# Patient Record
Sex: FEMALE | Race: WHITE
[De-identification: names, ages, dates, MRNs, and addresses within clinical notes are randomized per-mention and may not be internally consistent; named-entity substitution may affect disease eponyms.]

---

## 2018-02-03 ENCOUNTER — HOSPITAL ENCOUNTER (EMERGENCY)
Dept: HOSPITAL 74 - JER | Age: 38
Discharge: HOME | End: 2018-02-03
Payer: COMMERCIAL

## 2018-02-03 VITALS — BODY MASS INDEX: 32.1 KG/M2

## 2018-02-03 VITALS — HEART RATE: 89 BPM | TEMPERATURE: 101.3 F | DIASTOLIC BLOOD PRESSURE: 73 MMHG | SYSTOLIC BLOOD PRESSURE: 133 MMHG

## 2018-02-03 DIAGNOSIS — J09.X2: ICD-10-CM

## 2018-02-03 DIAGNOSIS — O26.891: Primary | ICD-10-CM

## 2018-02-03 DIAGNOSIS — O98.511: ICD-10-CM

## 2018-02-03 DIAGNOSIS — Z3A.12: ICD-10-CM

## 2018-02-03 NOTE — PDOC
History of Present Illness





- General


Chief Complaint: Cold Symptoms


Stated Complaint: 12 WEEKS PREGNANT,FEVER,HEADACHE


Time Seen by Provider: 02/03/18 21:12


History Source: Patient, Spouse


Exam Limitations: No Limitations





- History of Present Illness


Initial Comments: 





02/03/18 22:22


37-year-old female without any medical history presents to the emergency 

department complaining of fever, chills without nausea/vomiting, headache, 

dizziness, lightheadedness, facial pains, rhinorrhea, nasal congestion, earaches

, sore throat, neck pain/stiffness, back pains, chest pain, shortness of breath

, abdominal pains, flank pains, urinary symptoms, vaginal bleed. Patient states 

her child was recently diagnosed with influenza a. Patient states she is 

adamantly refusing ultrasound of her pregnancy. She states she is only here for 

her fever/chills. Tmax of 101.0. x8h


Timing/Duration: reports: yesterday


Severity: reports: mild


Associated Symptoms: reports: cough, fever/chills, headache





Past History





- Past Medical History


Allergies/Adverse Reactions: 


 Allergies











Allergy/AdvReac Type Severity Reaction Status Date / Time


 


aspirin Allergy Intermediate Rash Verified 02/03/18 21:06











Home Medications: 


Ambulatory Orders





Oseltamivir Phosphate [Tamiflu -] 75 mg PO BID #9 capsule 02/03/18 








Anemia: No


Asthma: No


Cancer: No


Cardiac Disorders: No


CVA: No


COPD: No


CHF: No


Dementia: No


Diabetes: No


GI Disorders: No


 Disorders: No


HTN: No


Hypercholesterolemia: No


Liver Disease: No


Seizures: No


Thyroid Disease: No





- Surgical History


Abdominal Surgery: Yes (TUMMY TUCK)


Appendectomy: No


Cardiac Surgery: No


Cholecystectomy: No


Lung Surgery: No


Neurologic Surgery: No


Orthopedic Surgery: No





- Suicide/Smoking/Psychosocial Hx


Smoking Status: No


Smoking History: Never smoked


Have you smoked in the past 12 months: No


Number of Cigarettes Smoked Daily: 0


Hx Alcohol Use: Yes (SOCIALLY)


Drug/Substance Use Hx: No


Substance Use Type: Alcohol


Hx Substance Use Treatment: No





**Review of Systems





- Review of Systems


Able to Perform ROS?: Yes


Comments:: 





02/03/18 21:44


CONSTITUTIONAL: 


+fever/chills


Absent: diaphoresis, generalized weakness, malaise, loss of appetite


HEENT: 


Absent: rhinorrhea, nasal congestion, throat pain, throat swelling, difficulty 

swallowing, mouth swelling, ear pain, eye pain, visual Changes


CARDIOVASCULAR: 


Absent: chest pain, loss of consciousness, palpitations, irregular heart rate, 

peripheral edema


RESPIRATORY: 


Absent: cough, shortness of breath, dyspnea with exertion, orthopnea, wheezing, 

stridor, hemoptysis


GASTROINTESTINAL:


Absent: abdominal pain, abdominal distension, nausea, vomiting, diarrhea, 

constipation, melena, hematochezia


GENITOURINARY: 


Absent: dysuria, frequency, urgency, hesitancy, hematuria, flank pain, genital 

pain


MUSCULOSKELETAL: 


Absent: myalgia, arthralgia, joint swelling


SKIN: 


Absent: rash, itching, pallor


HEMATOLOGIC/IMMUNOLOGIC: 


Absent: easy bleeding, easy bruising, lymphadenopathy, frequent infections


ENDOCRINE:


Absent: unexplained weight gain, unexplained weight loss, heat intolerance, 

cold intolerance


NEUROLOGIC: 


Absent: headache, focal weakness or paresthesias, dizziness, unsteady gait, 

seizure, mental status changes, bladder or bowel incontinence











Is the patient limited English proficient: No





*Physical Exam





- Vital Signs


 Last Vital Signs











Temp Pulse Resp BP Pulse Ox


 


 101.3 F H  89   20   133/73   99 


 


 02/03/18 21:03  02/03/18 21:03  02/03/18 21:03  02/03/18 21:03  02/03/18 21:03














- Physical Exam


Comments: 





02/03/18 21:44


GENERAL:


Well developed, well nourished. Awake and alert. No acute distress.


HEENT:


Normocephalic, atraumatic. PERRLA, EOMI. No conjunctival pallor. Sclera are non-

icteric. Moist mucous membranes. Oropharynx is clear.


NECK: 


Supple. Full ROM. No JVD. Carotid pulses 2+ and symmetric, without bruits. No 

thyromegaly. No lymphadenopathy.


CARDIOVASCULAR:


Regular rate and rhythm. No murmurs, rubs, or gallops. Distal pulses are 2+ and 

symmetric. 


PULMONARY: 


No evidence of respiratory distress. Lungs clear to auscultation bilaterally. 

No wheezing, rales or rhonchi.


ABDOMINAL:


Soft. Non-tender. Non-distended. No rebound or guarding. No organomegaly. 

Normoactive bowel sounds. 


MUSCULOSKELETAL 


Normal range of motion at all joints. No bony deformities or tenderness. No CVA 

tenderness.


EXTREMITIES: 


No cyanosis. No clubbing. No edema. No calf tenderness.


SKIN: 


Warm and dry. Normal capillary refill. No rashes. No jaundice. 








*DC/Admit/Observation/Transfer


Diagnosis at time of Disposition: 


 Influenza A (H1N1)








- Discharge Dispostion


Condition at time of disposition: Stable


Admit: No





- Prescriptions


Prescriptions: 


Oseltamivir Phosphate [Tamiflu -] 75 mg PO BID #9 capsule





- Referrals


Referrals: 


Melia Oden MD [Staff Physician] - 





- Patient Instructions


Printed Discharge Instructions:  DI for H1N1 Influenza -- Adult


Additional Instructions: 


Tylenol as needed for fever


Increase fluids


Tamiflu as prescribed





Return to the ER for severe/persistent/worsening symptoms





- Post Discharge Activity